# Patient Record
Sex: FEMALE | Race: WHITE | Employment: STUDENT | URBAN - NONMETROPOLITAN AREA
[De-identification: names, ages, dates, MRNs, and addresses within clinical notes are randomized per-mention and may not be internally consistent; named-entity substitution may affect disease eponyms.]

---
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
## 2024-03-07 DIAGNOSIS — H74.323: ICD-10-CM

## 2024-03-07 DIAGNOSIS — Z98.890 S/P TYMPANOPLASTY: ICD-10-CM

## 2024-03-07 DIAGNOSIS — H90.0 CONDUCTIVE HEARING LOSS, BILATERAL: Primary | ICD-10-CM

## 2024-03-08 ENCOUNTER — HOSPITAL ENCOUNTER (OUTPATIENT)
Dept: AUDIOLOGY | Age: 19
Discharge: HOME | End: 2024-03-08
Payer: COMMERCIAL

## 2024-03-08 PROCEDURE — 92567 TYMPANOMETRY: CPT | Performed by: AUDIOLOGIST

## 2024-03-08 PROCEDURE — 92557 COMPREHENSIVE HEARING TEST: CPT | Performed by: AUDIOLOGIST

## 2024-03-08 NOTE — PROGRESS NOTES
AUDIOLOGICAL EVALUATION      REASON FOR TESTING:  Audiometric evaluation per the request of Dr. Randolph, due to the diagnosis of conductive hearing loss (bilateral), partial loss of ear ossicles (bilateral) and s/p tympanoplasty. History of multiple ear surgeries. Previous audiometry, completed 12/13/22, revealed normal sloping to moderate conductive hearing loss in the higher frequencies, bilaterally. Word recognition scores were excellent, bilaterally. Tympanometry indicated normal ear canal volume, peak pressure and compliance, bilaterally. The patient does not report any changes in hearing ability. She denies any otalgia, aural pressure, tinnitus and vertigo. She has declined binaural amplification in the past.    OTOSCOPY: Clear canal, bilaterally.     AUDIOGRAM        Reliability: Good    COMMENTS: Normal hearing sensitivity, sloping to mild high frequency hearing loss for both ears. Masked bone conduction testing revealed conductive hearing loss at 4000Hz in the right ear. Thresholds are mostly stable, when compared to 12/13/22 audiometry. Word recognition ability is excellent at 100% for the left ear and excellent at 96% for the right ear. Tympanometry revealed normal peak pressure and normal middle ear compliance for both ears.      RECOMMENDATION(S):   1- Repeat audiogram and tympanogram annually for monitoring purposes. Testing should be completed sooner if any changes are noted in hearing ability.  2- Binaural amplification is recommended. The patient is not interested in pursuing hearing aids at this time.     AUDIOGRAM COMPLETED 12/13/2022:  AUDIOGRAM

## 2024-12-11 ENCOUNTER — PATIENT MESSAGE (OUTPATIENT)
Dept: ENT CLINIC | Age: 19
End: 2024-12-11

## 2024-12-12 NOTE — TELEPHONE ENCOUNTER
I spoke to the patient and recommended the Debrox as directed.   She is going to try that and see if it helps.   I ordered her an appointment and she declined at this time and said she would call us when she knows her schedule.   I let her know if if she has worsening pain or increased drainage, that she should contact us.